# Patient Record
Sex: FEMALE | Race: WHITE
[De-identification: names, ages, dates, MRNs, and addresses within clinical notes are randomized per-mention and may not be internally consistent; named-entity substitution may affect disease eponyms.]

---

## 2018-08-11 ENCOUNTER — HOSPITAL ENCOUNTER (EMERGENCY)
Dept: HOSPITAL 68 - ERH | Age: 63
End: 2018-08-11
Payer: COMMERCIAL

## 2018-08-11 VITALS — BODY MASS INDEX: 25.71 KG/M2 | HEIGHT: 66 IN | WEIGHT: 160 LBS

## 2018-08-11 VITALS — SYSTOLIC BLOOD PRESSURE: 167 MMHG | DIASTOLIC BLOOD PRESSURE: 70 MMHG

## 2018-08-11 DIAGNOSIS — M54.5: Primary | ICD-10-CM

## 2018-08-11 LAB
ABSOLUTE GRANULOCYTE CT: 5.1 /CUMM (ref 1.4–6.5)
BASOPHILS # BLD: 0.1 /CUMM (ref 0–0.2)
BASOPHILS NFR BLD: 0.7 % (ref 0–2)
EOSINOPHIL # BLD: 0.1 /CUMM (ref 0–0.7)
EOSINOPHIL NFR BLD: 1.5 % (ref 0–5)
ERYTHROCYTE [DISTWIDTH] IN BLOOD BY AUTOMATED COUNT: 17.3 % (ref 11.5–14.5)
GRANULOCYTES NFR BLD: 53.8 % (ref 42.2–75.2)
HCT VFR BLD CALC: 35.8 % (ref 37–47)
LYMPHOCYTES # BLD: 3 /CUMM (ref 1.2–3.4)
MCH RBC QN AUTO: 27.4 PG (ref 27–31)
MCHC RBC AUTO-ENTMCNC: 33 G/DL (ref 33–37)
MCV RBC AUTO: 83 FL (ref 81–99)
MONOCYTES # BLD: 1.1 /CUMM (ref 0.1–0.6)
PLATELET # BLD: 290 /CUMM (ref 130–400)
PMV BLD AUTO: 8.6 FL (ref 7.4–10.4)
RED BLOOD CELL CT: 4.32 /CUMM (ref 4.2–5.4)
WBC # BLD AUTO: 9.5 /CUMM (ref 4.8–10.8)

## 2018-08-11 NOTE — CT SCAN REPORT
EXAMINATION:
CT ABDOMEN AND PELVIS WITH CONTRAST
 
CLINICAL INFORMATION:
Diffuse low back pain and upper abdominal pain. Known metastatic carcinoid
carcinoma
 
COMPARISON:
CT 05/07/2018, CT 01/28/2014
 
TECHNIQUE:
Multidetector volumetric imaging was performed of the abdomen and pelvis
following IV administration of 95 mL of Optiray 320 intravenous contrast.
Sagittal and coronal reformatted images were obtained on the technologist's
workstation.
 
DLP:
298 mGy-cm
 
FINDINGS:
 
LUNG BASES: The visualized lung bases are unremarkable.
 
LIVER, GALLBLADDER, AND BILIARY TREE: The liver enhances homogeneously.
Multiple hypoattenuating lesions are demonstrated. These appear stable and
distribution and size compared to the 2014 study allowing for technical
differences between examinations. The largest area is located within the
right lobe of the liver measuring approximately 3.2 cm (image 18, series 2. A
large cystic areas identified within segment 4A of the liver measuring 2.4
cm. No intrahepatic biliary dilatation. The gallbladder is unremarkable with
no evidence of radiopaque gallstones, gallbladder wall thickening, or obvious
pericholecystic inflammatory changes.
 
PANCREAS: There is little pancreatic tissue visualized on the current study.
The portion of the head and uncinate process of the pancreas identified
enhances homogeneously.
 
SPLEEN: Unremarkable.
 
ADRENAL GLANDS: Known bilateral adrenal adenomas, stable from prior.
 
KIDNEYS AND URETERS: The kidneys are normal in size, shape, and attenuation.
No hydronephrosis, hydroureter, or calculi seen. No perinephric stranding.
 
BLADDER: Unremarkable.
 
GASTROINTESTINAL TRACT: A dominant hypodense mass with rim calcification in
the left upper quadrant measures 8.2 cm in size, previously 8.2 cm in size
and 2014. An adjacent smaller hypodense lesion with rim calcification along
the right anterior aspect measures 1.8 cm and is also stable compared to
prior. Dense stool is present throughout the colon. A normal appendix is seen
in the right lower quadrant. There are no dilated loops of small or large
bowel. No intraabdominal free air or free fluid.
 
ABDOMINAL WALL: No significant hernia is appreciated.
 
LYMPH NODES: Normal.
 
VASCULAR: Mild atherosclerotic calcific disease. Normal caliber aorta.
 
PELVIC VISCERA: The uterus appears surgically absent. No adnexal mass.
 
OSSEOUS STRUCTURES: Mild multilevel degenerative changes of lower thoracic
and lumbar spine.
 
IMPRESSION:
No acute intra-abdominal or pelvic findings. Essentially stable exam compared
to 2014 allowing for some technical differences between studies. Specific
findings as detailed above.

## 2018-08-11 NOTE — ED GI/GU/ABDOMINAL COMPLAINT
History of Present Illness
 
General
Chief Complaint: General Adult
Stated Complaint: PT IS HAVING  BACK PAIN AND HAS CANCER
Source: patient
Exam Limitations: no limitations
 
Vital Signs & Intake/Output
Vital Signs & Intake/Output
 Vital Signs
 
 
Date Time Temp Pulse Resp B/P B/P Pulse O2 O2 Flow FiO2
 
     Mean Ox Delivery Rate 
 
2113 98.1 57 18 167/70  98 Room Air  
 
2051 97.8 84 16 154/78  98 Room Air  
 
1951 98.2 65 16 153/88  96 Room Air  
 
 1950       Room Air  
 
 
 ED Intake and Output
 
 
  0000  1200
 
Intake Total  
 
Output Total  
 
Balance  
 
   
 
Intake, IV  
 
Patient 160 lb 
 
Weight  
 
Weight Reported by Patient 
 
Measurement  
 
Method  
 
 
 
Allergies
Coded Allergies:
Cephalosporins (Severe, ANAPHYLAXIS 17)
Uncoded Allergies:
METAL (FLUSH, HIVES 17)
 
Reconcile Medications
Albuterol Sulfate 1.25 MG/3 ML VIAL.NEB   1 Vial INH/SOL Q4-6 PRN cough/sob
Alprazolam 0.5 MG TABLET   1 TAB PO TID ANXIETY  (Reported)
Azithromycin (Zithromax) 250 MG TABLET   1 DP PO AD bronchitis 
     2 the first day followed by 1 for days 2-5
Budesonide/Formoterol Fumarate (Symbicort 160-4.5 Mcg Inhaler) 160 MCG-4.5 MCG/
ACTUATION HFA.AER.AD   1 PUF INH BID PRN ASTHMA  (Reported)
Canagliflozin (Invokana) 100 MG TABLET   1 TAB PO DAILY DM  (Reported)
Ciclesonide (Omnaris) 50 MCG SPRAY.PUMP   2 SPRAY MAHENDRA PRN SINUSES  (Reported)
Clotrimazole 10 MG MARIA LUZ   1 TAB PO 5 TIMES A DAY PRN THRUSH  (Reported)
Epinephrine (Epipen) 0.3 MG/0.3 ML AUTO.INJCT   1 UNIT IM ONCE PRN ANAPHYLAXIS  
(Reported)
Fluticasone Propionate 50 MCG/ACTUATION SPRAY.SUSP   2 SPRAY NASB PRN ALLERGIES 
(Reported)
Hydroxyzine HCl 10 MG TABLET   1 TAB PO DAILY ITCHING  (Reported)
Insulin Detemir (Levemir Flextouch) 100 UNIT/ML (3 ML) INSULN.PEN   14 UNITS SC 
QAM DM  (Reported)
Levalbuterol Tartrate (Xopenex Hfa) 45 MCG/ACTUATION HFA.AER.AD   2 PUF INH Q3H 
PRN ASTHMA  (Reported)
Loratadine (Claritin) 10 MG CAPSULE   1 TAB PO TID ALLERGIES  (Reported)
Lovastatin (Altoprev) 60 MG TAB.ER.24H   1 TAB PO QPM CHOLESTEROL  (Reported)
Metformin HCl (Metformin HCl ER) 1,000 MG TAB.ER.24   1 TAB PO DAILY DM  (
Reported)
Metformin HCl 1,000 MG TABLET   1 TAB PO QPM DM  (Reported)
Methimazole 5 MG TABLET   0.5 TAB PO DAILY THYROID  (Reported)
Methylprednisolone. (Medrol) 4 MG TAB.DS.PK   1 DP PO AD INFLAMMATION
     6 on day 1 then reduce by one tablet daily until gone
Moxifloxacin HCl (Avelox) 400 MG TABLET   1 TAB PO DAILY bronchitis
Pantoprazole Sodium 40 MG TABLET.DR   1 TAB PO QPM GI  (Reported)
Prednisone (Deltasone) 20 MG TABLET   1 TAB PO ONCE DAILY bronchitis 
Ramipril (Altace) 5 MG CAPSULE   1 CAP PO DAILY PROTECT KIDNEYS  (Reported)
Repaglinide 0.5 MG TABLET   1 TAB PO TID DM  (Reported)
 
Triage Note:
PT FROM HOME C/O UPPER AND LOWER BACK PAIN SINCE
0200. PT STATES HX OF PANCREATITIS AND PANCREATIC
CA AND BELIEVES THE PAIN IS FROM THAT. PT STATES
SHE WENT TO A WALK IN CLINIC WHERE SHE WAS
PROVIDED WITH FLEXERIL FOR BACK PAIN, TOOK IT
WITHOUT RELIEF. PT IN PAIN 10/10 IN TRIAGE. PT
STATES PAIN IS RADIATING TO ABD WITH BLOATING. PT
DENIES URINARY S/S, NAUSEA/VOMITING. BP ELEVATED
206/78.
Triage Nurses Notes Reviewed? yes
LMP (ages 10-50): post menopausal
Pregnant? N
Is pt currently breastfeeding? No
Onset: Abrupt
Duration: day(s): (1)
Timing: single episode today
Quality/Severity: cramping
Severity Numbers: 7
Location: left flank, right flank, LOWER BACK 
Radiation: back
Activities at Onset: none
Prior Abdominal Problems: none
Past Sexual History: Unobtainable at this time
Modifying Factors:
Worsens With: movement, palpation. 
HPI:
62-year-old female with past medical history of neuroendocrine cancer, COPD, 
hyperlipidemia presents for evaluation of back pain and flank pain.  Patient 
states that the pain started around 2 AM this morning and has been getting 
worse.  The pain is located on both sides of her lower back as well as her 
bilateral flanks.  The pain is described as sharp.  It is worse with movement or
touching the area.  She reports some associated nausea but no vomiting no chest 
pain shortness of breath fever numbness tingling bowel or bladder dysfunction.  
She is tolerating fluids.  She has taken Flexeril without any improvement.  
There is no trauma or triggering event but she does note she has been doing lots
of physical yard work recently.
(Javier Fischer)
 
Past History
 
Travel History
Traveled to [place] past 21 day No
 
Medical History
Any Pertinent Medical History? see below for history
Neurological: NONE
EENT: NONE
Cardiovascular: hyperlipidemia
Respiratory: asthma, ?COPD
Gastrointestinal: NONE
Hepatic: NONE
Renal: NONE
Musculoskeletal: NONE
Psychiatric: NONE
Endocrine: diabetes, hyperthyroidism, GOITER
Blood Disorders: NONE
Cancer(s): NEURO ENDOCRINE CA
GYN/Reproductive: NONE
 
Surgical History
Surgical History: non-contributory
 
Psychosocial History
What is your primary language English
Tobacco Use: Current Daily Use
Daily Tobacco Use Amount/Type: => 5 Cigarettes daily
ETOH Use: denies use
Illicit Drug Use: denies illicit drug use
 
Family History
Hx Contributory? No
(Javier Fischer)
 
Review of Systems
 
Review of Systems
Constitutional:
Reports: no symptoms. 
EENTM:
Reports: no symptoms. 
Respiratory:
Reports: no symptoms. 
Cardiovascular:
Reports: no symptoms. 
GI:
Reports: see HPI, abdominal pain. 
Genitourinary:
Reports: no symptoms. 
Musculoskeletal:
Reports: see HPI, back pain, muscle pain, muscle stiffness. 
Skin:
Reports: no symptoms. 
Neurological/Psychological:
Reports: no symptoms. 
Hematologic/Endocrine:
Reports: no symptoms. 
Immunologic/Allergic:
Reports: no symptoms. 
All Other Systems: Reviewed and Negative
(Javier Fischer)
 
Physical Exam
 
Physical Exam
General Appearance: well developed/nourished, no apparent distress, alert, awake
Head: atraumatic, normal appearance
Eyes:
Bilateral: normal appearance, PERRL, EOMI. 
Ears, Nose, Throat, Mouth: moist mucous membrane
Neck: normal inspection, supple, full range of motion
Respiratory: normal breath sounds, chest non-tender, no respiratory distress, 
lungs clear
Cardiovascular: regular rate/rhythm, normal peripheral pulses
Peripheral Pulses:
2+ radial (R), 2+ radial (L)
Gastrointestinal: normal bowel sounds, soft, no organomegaly, BILATERAL FLANKS 
ARE TENDER TO PALPATION NO BRUISING SWELLING OR ABRASIONS
Back: normal inspection, normal range of motion, LUMBAR SPINE AND PARASPINAL 
MUSCLES ARE TENDER TO PALPATION BILATERALLY NO STEP-OFFS OR DEFORMITIES NO 
BRUISING SWELLING OR ABRASIONS
Extremities: normal range of motion, straight leg raised (NEGATIVE BILATERALLY),
PATELLAR REFLEX 2+ BILATERALLY
Neurologic/Psych: no motor/sensory deficits, awake, alert, oriented x 3, normal 
gait, normal mood/affect
Skin: intact, normal color, warm/dry
 
Core Measures
ACS in differential dx? No
Sepsis Present: No
Sepsis Focused Exam Completed? No
(Barry LEMUS,Javier)
 
Progress
Differential Diagnosis: appendicitis, biliary colic, bowel obstruction, 
cholecystitis, diverticulitis, gastritis, ischemic bowel, inflamm bowel dis, 
intrauterine pregnancy, kidney stone, pancreatitis, peptic ulcer, PUD/GERD, 
perforated viscous, SBO, UTI/pyelo
Plan of Care:
 Laboratory Tests
 
 
 
18:
Lactic Acid Cancelled
 
18:
Urine Color YEL, Urine Clarity CLEAR, Urine pH 6.0, Ur Specific Gravity 1.010, 
Urine Protein NEG, Urine Ketones NEG, Urine Nitrite NEG, Urine Bilirubin NEG, 
Urine Urobilinogen 0.2, Ur Leukocyte Esterase NEG, Ur Microscopic EXAM NOT 
REQUIRED, Urine Hemoglobin NEG, Urine Glucose 250  H
 
 
Patient is here for evaluation of bilateral low back pain relating to the 
bilateral flanks.  No trauma.  She is neurologically intact no signs or symptoms
of cauda equina.  Patient was medicated with IV morphine and Toradol.  Labs CT 
scan EKG ordered.
 
Patient is feeling much better after being medicated.  She is no longer having 
significant pain she rates her pain as a 1 out of 10.  CT scan is unremarkable 
and unchanged from previous.  Basic blood work is also unremarkable.  Patient 
will be discharged home with instructions to continue her at home pain 
medication.  She has oxycodone Flexeril.  Discussed return precautions in detail
patient agrees the plan
Diagnostic Imaging:
Viewed by Me: CT Scan.  Discussed w/RAD: CT Scan. 
Radiology Impression: PATIENT: ARMANDO GARCIA  MEDICAL RECORD NO: [id number] PRESENT
AGE: 62  PATIENT ACCOUNT NO: [id number] : 55  LOCATION: [facility] ORDERING 
PHYSICIAN: Javier LEMUS     SERVICE DATE:  EXAM TYPE: CAT - CT ABD
& PELVIS W IV CONTRAST EXAMINATION: CT ABDOMEN AND PELVIS WITH CONTRAST CLINICAL
INFORMATION: Diffuse low back pain and upper abdominal pain. Known metastatic 
carcinoid carcinoma COMPARISON: CT 2018, CT 2014 TECHNIQUE: 
Multidetector volumetric imaging was performed of the abdomen and pelvis 
following IV administration of 95 mL of Optiray 320 intravenous contrast. 
Sagittal and coronal reformatted images were obtained on the technologist's 
workstation. DLP: 298 mGy-cm FINDINGS: LUNG BASES: The visualized lung bases are
unremarkable. LIVER, GALLBLADDER, AND BILIARY TREE: The liver enhances 
homogeneously. Multiple hypoattenuating lesions are demonstrated. These appear 
stable and distribution and size compared to the 2014 study allowing for 
technical differences between examinations. The largest area is located within 
the right lobe of the liver measuring approximately 3.2 cm (image 18, series 2. 
A large cystic areas identified within segment 4A of the liver measuring 2.4 cm.
No intrahepatic biliary dilatation. The gallbladder is unremarkable with no 
evidence of radiopaque gallstones, gallbladder wall thickening, or obvious 
pericholecystic inflammatory changes. PANCREAS: There is little pancreatic 
tissue visualized on the current study. The portion of the head and uncinate 
process of the pancreas identified enhances homogeneously. SPLEEN: Unremarkable.
ADRENAL GLANDS: Known bilateral adrenal adenomas, stable from prior. KIDNEYS AND
URETERS: The kidneys are normal in size, shape, and attenuation. No 
hydronephrosis, hydroureter, or calculi seen. No perinephric stranding. BLADDER:
Unremarkable. GASTROINTESTINAL TRACT: A dominant hypodense mass with rim 
calcification in the left upper quadrant measures 8.2 cm in size, previously 8.2
cm in size and 2014. An adjacent smaller hypodense lesion with rim calcification
along the right anterior aspect measures 1.8 cm and is also stable compared to 
prior. Dense stool is present throughout the colon. A normal appendix is seen in
the right lower quadrant. There are no dilated loops of small or large bowel. No
intraabdominal free air or free fluid. ABDOMINAL WALL: No significant hernia is 
appreciated. LYMPH NODES: Normal. VASCULAR: Mild atherosclerotic calcific 
disease. Normal caliber aorta. PELVIC VISCERA: The uterus appears surgically 
absent. No adnexal mass. OSSEOUS STRUCTURES: Mild multilevel degenerative 
changes of lower thoracic and lumbar spine. IMPRESSION: No acute intra-abdominal
or pelvic findings. Essentially stable exam compared to 2014 allowing for some 
technical differences between studies. Specific findings as detailed above. 
DICTATED BY: Caro Molina MD  DATE/TIME DICTATED:18 
:[facility]  DATE/TIME TRANSCRIBED:08/11/18 / 2003 CONFIDENTIAL, DO NOT COPY 
WITHOUT APPROPRIATE AUTHORIZATION.
Initial ED EKG: normal sinus rhythm, RBBB
Prior EKG: unchanged
(Javier Fischer)
 
Departure
 
Departure
Disposition: HOME OR SELF CARE
Condition: Stable
Clinical Impression
Primary Impression: Low back pain
Qualifiers:  Chronicity: acute Back pain laterality: bilateral Sciatica presence
: without sciatica Qualified Code: M54.5 - Low back pain
Referrals:
Jennifer Sanchez MD (PCP/Family)
 
Additional Instructions:
Rest, avoid heavy lifting bending or physical activity.  Tylenol Profen for 
pain.  Flexeril oxycodone for severe pain these may cause drowsiness.  Follow up
with her primary care doctor or an orthopedic doctor as soon as possible monitor
your symptoms return with any concerns.
Departure Forms:
Customer Survey
General Discharge Information
(Javier Fischer)
 
PA/NP Co-Sign Statement
Statement:
ED Attending supervision documentation-
 
[] I saw and evaluated the patient. I have also reviewed all the pertinent lab 
results and diagnostic results. I agree with the findings and the plan of care 
as documented in the PA's/NP's documentation. 
 
[X] I have reviewed the ED Record and agree with the PA's/NP's documentation.
 
[] Additions or exceptions (if any) to the PAs/NP's note and plan are 
summarized below:
[]
 
(Kiel Crawley DO)